# Patient Record
Sex: FEMALE
[De-identification: names, ages, dates, MRNs, and addresses within clinical notes are randomized per-mention and may not be internally consistent; named-entity substitution may affect disease eponyms.]

---

## 2020-08-31 VITALS
DIASTOLIC BLOOD PRESSURE: 80 MMHG | HEART RATE: 82 BPM | WEIGHT: 119.13 LBS | BODY MASS INDEX: 19.61 KG/M2 | HEIGHT: 65.25 IN | SYSTOLIC BLOOD PRESSURE: 118 MMHG | TEMPERATURE: 98.3 F

## 2022-04-11 PROBLEM — Z00.00 ENCOUNTER FOR PREVENTIVE HEALTH EXAMINATION: Status: ACTIVE | Noted: 2022-04-11

## 2022-04-13 ENCOUNTER — APPOINTMENT (OUTPATIENT)
Dept: PEDIATRICS | Facility: CLINIC | Age: 22
End: 2022-04-13
Payer: COMMERCIAL

## 2022-04-13 VITALS
BODY MASS INDEX: 20.06 KG/M2 | HEART RATE: 80 BPM | DIASTOLIC BLOOD PRESSURE: 70 MMHG | RESPIRATION RATE: 12 BRPM | HEIGHT: 65 IN | WEIGHT: 120.38 LBS | SYSTOLIC BLOOD PRESSURE: 106 MMHG

## 2022-04-13 DIAGNOSIS — Z00.00 ENCOUNTER FOR GENERAL ADULT MEDICAL EXAMINATION W/OUT ABNORMAL FINDINGS: ICD-10-CM

## 2022-04-13 DIAGNOSIS — Z87.09 PERSONAL HISTORY OF OTHER DISEASES OF THE RESPIRATORY SYSTEM: ICD-10-CM

## 2022-04-13 PROCEDURE — 99395 PREV VISIT EST AGE 18-39: CPT | Mod: 25

## 2022-04-13 PROCEDURE — 90472 IMMUNIZATION ADMIN EACH ADD: CPT

## 2022-04-13 PROCEDURE — 90715 TDAP VACCINE 7 YRS/> IM: CPT

## 2022-04-13 PROCEDURE — 99173 VISUAL ACUITY SCREEN: CPT | Mod: 59

## 2022-04-13 PROCEDURE — 90686 IIV4 VACC NO PRSV 0.5 ML IM: CPT

## 2022-04-13 PROCEDURE — 92551 PURE TONE HEARING TEST AIR: CPT

## 2022-04-13 PROCEDURE — 86580 TB INTRADERMAL TEST: CPT

## 2022-04-13 PROCEDURE — 90471 IMMUNIZATION ADMIN: CPT

## 2022-04-13 NOTE — PHYSICAL EXAM

## 2022-04-13 NOTE — HISTORY OF PRESENT ILLNESS
[Needs Immunizations] : needs immunizations [Normal] : normal [Irregular menses] : no irregular menses [Heavy Bleeding] : no heavy bleeding [Painful Cramps] : no painful cramps [Hirsutism] : no hirsutism [Acne] : no acne [Eats meals with family] : eats meals with family [Has family members/adults to turn to for help] : has family members/adults to turn to for help [Is permitted and is able to make independent decisions] : Is permitted and is able to make independent decisions [Sleep Concerns] : no sleep concerns [Eats regular meals including adequate fruits and vegetables] : eats regular meals including adequate fruits and vegetables [Drinks non-sweetened liquids] : drinks non-sweetened liquids  [Calcium source] : calcium source [Has concerns about body or appearance] : does not have concerns about body or appearance [Has friends] : has friends [At least 1 hour of physical activity a day] : at least 1 hour of physical activity a day [Uses electronic nicotine delivery system] : does not use electronic nicotine delivery system [Exposure to electronic nicotine delivery system] : no exposure to electronic nicotine delivery system [Uses tobacco] : does not use tobacco [Exposure to tobacco] : no exposure to tobacco [Uses drugs] : does not use drugs  [Exposure to drugs] : no exposure to drugs [Drinks alcohol] : does not drink alcohol [Exposure to alcohol] : no exposure to alcohol [No] : No cigarette smoke exposure [Uses safety belts/safety equipment] : uses safety belts/safety equipment  [Yes] : Patient has had sexual intercourse. [Has ways to cope with stress] : has ways to cope with stress [Displays self-confidence] : displays self-confidence [Has problems with sleep] : does not have problems with sleep [Gets depressed, anxious, or irritable/has mood swings] : does not get depressed, anxious, or irritable/has mood swings [Has thought about hurting self or considered suicide] : has not thought about hurting self or considered suicide [With Teen] : teen [FreeTextEntry7] : doing well [de-identified] : no concerns [de-identified] : college -education major [de-identified] : condoms [FreeTextEntry1] : doing well

## 2022-04-13 NOTE — DISCUSSION/SUMMARY
[Met privately with the adolescent for part of the office visit?] : Met privately with the adolescent for part of the office visit? Yes [Adolescent demonstrates understanding of his/her conditions and how to take prescribed medications?] : Adolescent demonstrates understanding of his/her conditions and how to take prescribed medications? Yes [Adolescent asks questions during each office  visit and participates in the care plan?] : Adolescent asks questions during each office visit and participates in the care plan? Yes [Adolescent is competent in independently making appointments, filling prescriptions, following up on referrals, and seeking emergency services, as needed?] : Adolescent is competent in independently making appointments, filling prescriptions, following up on referrals, and seeking emergency services, as needed? Yes [Adolescent's caregivers were provided with the opportunity to discuss their concerns about transferring decision making responsibility to the adolescent?] : Adolescent's caregivers were provided with the opportunity to discuss their concerns about transferring decision making responsibility to the adolescent? Yes [Initiated discussion about transfer to an adult healthcare provider?] : Initiated discussion about transfer to an adult healthcare provider? Yes  [Discussed choices for adult care and assist in identifying possible care providers?] : Discussed choices for adult care and assist in identifying possible care providers? Yes [Initiated communication with the adult provider that the family and adolescent has selected?] : Initiated communication with the adult provider that the family and adolescent has selected? Yes [Provided copy of  transition letter?] : Provided copy of transition letter? No [FreeTextEntry1] : not at risk for TB\par labs drawn\par f/u with internist\par PPD placed f/u 2 days\par Time allowed for questions and all answered with understanding.\par

## 2022-04-14 LAB
BASOPHILS # BLD AUTO: 0.02 K/UL
BASOPHILS NFR BLD AUTO: 0.4 %
CHOLEST SERPL-MCNC: 121 MG/DL
EOSINOPHIL # BLD AUTO: 0.11 K/UL
EOSINOPHIL NFR BLD AUTO: 1.9 %
HCT VFR BLD CALC: 37.5 %
HDLC SERPL-MCNC: 40 MG/DL
HGB BLD-MCNC: 12.4 G/DL
IMM GRANULOCYTES NFR BLD AUTO: 0.4 %
LDLC SERPL CALC-MCNC: 59 MG/DL
LYMPHOCYTES # BLD AUTO: 1.84 K/UL
LYMPHOCYTES NFR BLD AUTO: 32.3 %
MAN DIFF?: NORMAL
MCHC RBC-ENTMCNC: 29.7 PG
MCHC RBC-ENTMCNC: 33.1 GM/DL
MCV RBC AUTO: 89.9 FL
MONOCYTES # BLD AUTO: 0.38 K/UL
MONOCYTES NFR BLD AUTO: 6.7 %
NEUTROPHILS # BLD AUTO: 3.33 K/UL
NEUTROPHILS NFR BLD AUTO: 58.3 %
NONHDLC SERPL-MCNC: 81 MG/DL
PLATELET # BLD AUTO: 213 K/UL
RBC # BLD: 4.17 M/UL
RBC # FLD: 12.8 %
TRIGL SERPL-MCNC: 112 MG/DL
WBC # FLD AUTO: 5.7 K/UL

## 2022-04-19 ENCOUNTER — APPOINTMENT (OUTPATIENT)
Dept: PEDIATRICS | Facility: CLINIC | Age: 22
End: 2022-04-19
Payer: COMMERCIAL

## 2022-04-19 DIAGNOSIS — Z11.1 ENCOUNTER FOR SCREENING FOR RESPIRATORY TUBERCULOSIS: ICD-10-CM

## 2022-04-19 PROCEDURE — 99212 OFFICE O/P EST SF 10 MIN: CPT

## 2022-04-19 NOTE — HISTORY OF PRESENT ILLNESS
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

## 2022-04-19 NOTE — DISCUSSION/SUMMARY
[FreeTextEntry1] : PPD PLACED\par RETURN IN 72 HRS FOR READING\par \par \par Time allowed for questions and all answered with understanding.\par

## 2022-04-19 NOTE — HISTORY OF PRESENT ILLNESS
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